# Patient Record
Sex: FEMALE | Race: WHITE | Employment: OTHER | URBAN - METROPOLITAN AREA
[De-identification: names, ages, dates, MRNs, and addresses within clinical notes are randomized per-mention and may not be internally consistent; named-entity substitution may affect disease eponyms.]

---

## 2017-02-14 ENCOUNTER — HOSPITAL ENCOUNTER (OUTPATIENT)
Dept: MAMMOGRAPHY | Age: 67
Discharge: HOME | End: 2017-02-14
Attending: INTERNAL MEDICINE | Admitting: INTERNAL MEDICINE

## 2017-02-14 DIAGNOSIS — Z12.31 ENCOUNTER FOR SCREENING MAMMOGRAM FOR BREAST CANCER: ICD-10-CM

## 2017-02-20 ENCOUNTER — HOSPITAL ENCOUNTER (OUTPATIENT)
Dept: MAMMOGRAPHY | Age: 67
Discharge: HOME | End: 2017-02-20
Admitting: INTERNAL MEDICINE

## 2017-02-20 DIAGNOSIS — R92.2 INCONCLUSIVE MAMMOGRAM: ICD-10-CM

## 2018-12-19 ENCOUNTER — OFFICE VISIT (OUTPATIENT)
Dept: ENT CLINIC | Age: 68
End: 2018-12-19
Payer: COMMERCIAL

## 2018-12-19 VITALS
DIASTOLIC BLOOD PRESSURE: 77 MMHG | HEART RATE: 74 BPM | HEIGHT: 68 IN | WEIGHT: 186 LBS | SYSTOLIC BLOOD PRESSURE: 135 MMHG | BODY MASS INDEX: 28.19 KG/M2

## 2018-12-19 DIAGNOSIS — H91.21 SUDDEN RIGHT HEARING LOSS: Primary | ICD-10-CM

## 2018-12-19 PROCEDURE — 99203 OFFICE O/P NEW LOW 30 MIN: CPT | Performed by: OTOLARYNGOLOGY

## 2018-12-19 NOTE — PROGRESS NOTES
and hyoid bone were normal.  The trachea was midline. THYROID:  Normal with no masses or tenderness or goiter bilaterally. LYMPH NODES, CERVICAL, FACIAL AND SUPRACLAVICULAR:  No lymphadenopathy detected bilaterally. IMPRESSION / Swenson Born / Darnell Black:       Wesley Baldwin was seen today for hearing loss. Diagnoses and all orders for this visit:    Sudden right hearing loss  -     MONROE Schmidt, Audiology, [facility]        RECOMMENDATIONS/PLAN:    1. I ordered audiometric testing.   2. Return for recheck and follow-up after hearing test.

## 2019-01-03 ENCOUNTER — PROCEDURE VISIT (OUTPATIENT)
Dept: AUDIOLOGY | Age: 69
End: 2019-01-03
Payer: COMMERCIAL

## 2019-01-03 DIAGNOSIS — H91.13 PRESBYCUSIS OF BOTH EARS: Primary | ICD-10-CM

## 2019-01-03 PROCEDURE — 92557 COMPREHENSIVE HEARING TEST: CPT | Performed by: AUDIOLOGIST

## 2019-01-03 PROCEDURE — 92550 TYMPANOMETRY & REFLEX THRESH: CPT | Performed by: AUDIOLOGIST

## 2019-01-07 ENCOUNTER — OFFICE VISIT (OUTPATIENT)
Dept: ENT CLINIC | Age: 69
End: 2019-01-07
Payer: COMMERCIAL

## 2019-01-07 VITALS
DIASTOLIC BLOOD PRESSURE: 79 MMHG | SYSTOLIC BLOOD PRESSURE: 146 MMHG | BODY MASS INDEX: 28.04 KG/M2 | WEIGHT: 185 LBS | HEART RATE: 84 BPM | HEIGHT: 68 IN

## 2019-01-07 DIAGNOSIS — H90.3 BILATERAL SENSORINEURAL HEARING LOSS: Primary | ICD-10-CM

## 2019-01-07 PROBLEM — H91.21 SUDDEN RIGHT HEARING LOSS: Status: ACTIVE | Noted: 2019-01-07

## 2019-01-07 PROCEDURE — 99214 OFFICE O/P EST MOD 30 MIN: CPT | Performed by: OTOLARYNGOLOGY

## 2019-07-31 ENCOUNTER — HOSPITAL ENCOUNTER (OUTPATIENT)
Dept: WOMENS IMAGING | Age: 69
Discharge: HOME | End: 2019-07-31
Payer: COMMERCIAL

## 2019-07-31 DIAGNOSIS — Z12.39 BREAST CANCER SCREENING: ICD-10-CM

## 2019-07-31 PROCEDURE — 77063 BREAST TOMOSYNTHESIS BI: CPT

## 2020-01-08 ENCOUNTER — HOSPITAL ENCOUNTER (EMERGENCY)
Age: 70
Discharge: HOME | End: 2020-01-08
Payer: COMMERCIAL

## 2020-01-08 VITALS
WEIGHT: 185 LBS | HEIGHT: 68 IN | RESPIRATION RATE: 23 BRPM | TEMPERATURE: 96.7 F | OXYGEN SATURATION: 95 % | SYSTOLIC BLOOD PRESSURE: 163 MMHG | DIASTOLIC BLOOD PRESSURE: 70 MMHG | BODY MASS INDEX: 28.04 KG/M2 | HEART RATE: 70 BPM

## 2020-01-08 PROCEDURE — 99282 EMERGENCY DEPT VISIT SF MDM: CPT

## 2020-01-08 ASSESSMENT — ENCOUNTER SYMPTOMS
VOMITING: 1
RHINORRHEA: 0
WHEEZING: 0
SHORTNESS OF BREATH: 0
DIARRHEA: 0
COUGH: 0
NAUSEA: 0
ABDOMINAL PAIN: 0

## 2020-01-08 NOTE — ED PROVIDER NOTES
[address] [facility]        Pt Name: Lisa Franks  MRN: [id number]  [place] 1950  Date of evaluation: 1/8/2020  Provider: Kelsey Gaming PA-C  PCP: Maria Teresa Mirza MD    This patient was not seen and evaluated by the attending physician No att. providers found. CHIEF COMPLAINT       Chief Complaint   Patient presents with    Allergic Reaction     pt brought from home by [facility]. pt took lexapro and then felt dizzy and nauseous. vomited. had no eaten prior. squad given 25mg of benadryl. HISTORY OF PRESENT ILLNESS   (Location/Symptom, Timing/Onset, Context/Setting, Quality, Duration, Modifying Factors, Severity)  Note limiting factors. Lisa Franks is a 71 y.o. female presents for evaluation of possible allergic reaction to Lexapro. She states that she was started on this for history of anxiety and panic attacks and took first dose today. States that about 30 minutes after taking that she became very dizzy and had a single episode of emesis. Unsure whether or not she had true syncope but states that she did lay down on the ground in the garage and felt very dizzy. Her  called EMS. She states that she remembers all of this. She was given 25 mg of Benadryl IV via squad in route. She states that she feels better at this time. She has no chest pain or shortness of breath. She does report history of COPD but denies tightness or wheezing. No cough or congestion. No abdominal pain. No rashes. No other complaints or concerns at this time. Nursing Notes were all reviewed and agreed with or any disagreements were addressed in the HPI. REVIEW OF SYSTEMS    (2-9 systems for level 4, 10 or more for level 5)     Review of Systems   Constitutional: Negative for appetite change, chills and fever. HENT: Negative for congestion and rhinorrhea. Eyes: Negative for visual disturbance. Respiratory: Negative for cough, shortness of breath and wheezing. Cardiovascular: Negative for chest pain. Gastrointestinal: Positive for vomiting. Negative for abdominal pain, diarrhea and nausea. Genitourinary: Negative for difficulty urinating, dysuria and hematuria. Musculoskeletal: Negative for neck pain and neck stiffness. Skin: Negative for rash. Neurological: Positive for dizziness. Negative for syncope, weakness, light-headedness and headaches. Positives and Pertinent negatives as per HPI. Except as noted above in the ROS, all other systems were reviewed and negative. PAST MEDICAL HISTORY     Past Medical History:   Diagnosis Date    COPD (chronic obstructive pulmonary disease) ([facility] [address]     Hyperlipidemia     Hypertension     Vitamin D deficiency          SURGICAL HISTORY   History reviewed. No pertinent surgical history. [address]       Discharge Medication List as of 2020  7:21 PM      CONTINUE these medications which have NOT CHANGED    Details   atenolol (TENORMIN) 100 MG tablet Take 1 tablet by mouth daily, Disp-90 tablet, R-0Print      pravastatin (PRAVACHOL) 20 MG tablet Take 1 tablet by mouth daily, Disp-90 tablet, R-0Print      albuterol (PROVENTIL HFA) 108 (90 BASE) MCG/ACT inhaler Inhale 2 puffs into the lungs every 6 hours as needed for Wheezing., Disp-1 Inhaler, R-3NO PRINT               ALLERGIES     Pcn [penicillins]    FAMILYHISTORY     History reviewed. No pertinent family history.        SOCIAL HISTORY       Social History     Tobacco Use    Smoking status: Former Smoker     Packs/day: 1.00     Years: 34.00     Pack years: 34.00     Types: Cigarettes     Start date:      Last attempt to quit: 1998     Years since quittin.0    Smokeless tobacco: Never Used   Substance Use Topics    Alcohol use: No    Drug use: No       SCREENINGS             PHYSICAL EXAM    (up to 7 for level 4, 8 or more for level 5)     ED Triage Vitals [01/08/20 1748]   BP Temp Temp Source Pulse Resp SpO2 Height Weight   (!) 148/62 96.7 °F (35.9 °C) Oral 67 13 91 % 5' 8\" (1.727 m) 185 lb (83.9 kg)       Physical Exam  Vitals signs and nursing note reviewed. Constitutional:       Appearance: She is well-developed. She is not diaphoretic. HENT:      Head: Normocephalic and atraumatic. Right Ear: External ear normal.      Left Ear: External ear normal.      Nose: Nose normal.   Eyes:      General:         Right eye: No discharge. Left eye: No discharge. Neck:      Musculoskeletal: Normal range of motion and neck supple. Cardiovascular:      Rate and Rhythm: Normal rate and regular rhythm. Heart sounds: Normal heart sounds. Pulmonary:      Effort: Pulmonary effort is normal. No respiratory distress. Breath sounds: Normal breath sounds. No wheezing, rhonchi or rales. Chest:      Chest wall: No tenderness. Abdominal:      General: There is no distension. Tenderness: There is no tenderness. Musculoskeletal: Normal range of motion. Skin:     General: Skin is warm and dry. Neurological:      Mental Status: She is alert and oriented to person, place, and time. Mental status is at baseline. GCS: GCS eye subscore is 4. GCS verbal subscore is 5. GCS motor subscore is 6. Cranial Nerves: Cranial nerves are intact. Psychiatric:         Behavior: Behavior normal.         DIAGNOSTIC RESULTS   LABS:    Labs Reviewed - No data to display    All other labs were within normal range or not returned as of this dictation. EKG: All EKG's are interpreted by the Emergency Department Physician in the absence of a cardiologist.  Please see their note for interpretation of EKG.       RADIOLOGY:   Non-plain film images such as CT, Ultrasound and MRI are read by the radiologist. Plain radiographic images are visualized and preliminarily interpreted by the  ED Provider with the below findings:        Interpretation per the Radiologist

## 2020-01-08 NOTE — ED NOTES
Bed: 08  Expected date:   Expected time:   Means of arrival:   Comments:  lincoln Frey, SHERRY  01/08/20 4798

## 2021-01-05 ENCOUNTER — APPOINTMENT (OUTPATIENT)
Dept: MRI IMAGING | Age: 71
End: 2021-01-05
Payer: COMMERCIAL

## 2021-01-05 ENCOUNTER — HOSPITAL ENCOUNTER (OUTPATIENT)
Dept: NON INVASIVE DIAGNOSTICS | Age: 71
Discharge: HOME | End: 2021-01-05
Payer: COMMERCIAL

## 2021-01-05 LAB
LV EF: 55 %
LVEF MODALITY: NORMAL

## 2021-01-05 PROCEDURE — 93306 TTE W/DOPPLER COMPLETE: CPT

## 2021-07-14 ENCOUNTER — APPOINTMENT (OUTPATIENT)
Dept: CT IMAGING | Age: 71
DRG: 392 | End: 2021-07-14
Payer: COMMERCIAL

## 2021-07-14 ENCOUNTER — APPOINTMENT (OUTPATIENT)
Dept: GENERAL RADIOLOGY | Age: 71
DRG: 392 | End: 2021-07-14
Payer: COMMERCIAL

## 2021-07-14 ENCOUNTER — HOSPITAL ENCOUNTER (INPATIENT)
Age: 71
LOS: 1 days | Discharge: HOME | DRG: 392 | End: 2021-07-16
Attending: INTERNAL MEDICINE | Admitting: INTERNAL MEDICINE
Payer: COMMERCIAL

## 2021-07-14 DIAGNOSIS — R09.02 HYPOXIA: ICD-10-CM

## 2021-07-14 DIAGNOSIS — R55 NEAR SYNCOPE: Primary | ICD-10-CM

## 2021-07-14 DIAGNOSIS — R19.7 NAUSEA VOMITING AND DIARRHEA: ICD-10-CM

## 2021-07-14 DIAGNOSIS — R11.2 NAUSEA VOMITING AND DIARRHEA: ICD-10-CM

## 2021-07-14 LAB
A/G RATIO: 1.3 (ref 1.1–2.2)
ALBUMIN SERPL-MCNC: 3.5 G/DL (ref 3.4–5)
ALP BLD-CCNC: 55 U/L (ref 40–129)
ALT SERPL-CCNC: 9 U/L (ref 10–40)
ANION GAP SERPL CALCULATED.3IONS-SCNC: 10 MMOL/L (ref 3–16)
AST SERPL-CCNC: 22 U/L (ref 15–37)
BASOPHILS ABSOLUTE: 0 K/UL (ref 0–0.2)
BASOPHILS RELATIVE PERCENT: 0.6 %
BILIRUB SERPL-MCNC: 0.3 MG/DL (ref 0–1)
BUN BLDV-MCNC: 16 MG/DL (ref 7–20)
CALCIUM SERPL-MCNC: 8 MG/DL (ref 8.3–10.6)
CHLORIDE BLD-SCNC: 107 MMOL/L (ref 99–110)
CO2: 22 MMOL/L (ref 21–32)
CREAT SERPL-MCNC: 0.6 MG/DL (ref 0.6–1.2)
EOSINOPHILS ABSOLUTE: 0.1 K/UL (ref 0–0.6)
EOSINOPHILS RELATIVE PERCENT: 0.9 %
GFR AFRICAN AMERICAN: >60
GFR NON-AFRICAN AMERICAN: >60
GLOBULIN: 2.7 G/DL
GLUCOSE BLD-MCNC: 137 MG/DL (ref 70–99)
HCT VFR BLD CALC: 35 % (ref 36–48)
HEMOGLOBIN: 11.5 G/DL (ref 12–16)
LACTIC ACID, SEPSIS: 1.1 MMOL/L (ref 0.4–1.9)
LIPASE: 28 U/L (ref 13–60)
LYMPHOCYTES ABSOLUTE: 0.6 K/UL (ref 1–5.1)
LYMPHOCYTES RELATIVE PERCENT: 9.2 %
MCH RBC QN AUTO: 32 PG (ref 26–34)
MCHC RBC AUTO-ENTMCNC: 33 G/DL (ref 31–36)
MCV RBC AUTO: 97.2 FL (ref 80–100)
MONOCYTES ABSOLUTE: 0.4 K/UL (ref 0–1.3)
MONOCYTES RELATIVE PERCENT: 6.2 %
NEUTROPHILS ABSOLUTE: 5.1 K/UL (ref 1.7–7.7)
NEUTROPHILS RELATIVE PERCENT: 83.1 %
PDW BLD-RTO: 13.3 % (ref 12.4–15.4)
PLATELET # BLD: 162 K/UL (ref 135–450)
PMV BLD AUTO: 9.2 FL (ref 5–10.5)
POTASSIUM REFLEX MAGNESIUM: 4.3 MMOL/L (ref 3.5–5.1)
PRO-BNP: 474 PG/ML (ref 0–124)
RBC # BLD: 3.6 M/UL (ref 4–5.2)
SODIUM BLD-SCNC: 139 MMOL/L (ref 136–145)
TOTAL PROTEIN: 6.2 G/DL (ref 6.4–8.2)
TROPONIN: <0.01 NG/ML
WBC # BLD: 6.2 K/UL (ref 4–11)

## 2021-07-14 PROCEDURE — 83605 ASSAY OF LACTIC ACID: CPT

## 2021-07-14 PROCEDURE — 2580000003 HC RX 258: Performed by: PHYSICIAN ASSISTANT

## 2021-07-14 PROCEDURE — 6360000004 HC RX CONTRAST MEDICATION: Performed by: PHYSICIAN ASSISTANT

## 2021-07-14 PROCEDURE — 96361 HYDRATE IV INFUSION ADD-ON: CPT

## 2021-07-14 PROCEDURE — U0003 INFECTIOUS AGENT DETECTION BY NUCLEIC ACID (DNA OR RNA); SEVERE ACUTE RESPIRATORY SYNDROME CORONAVIRUS 2 (SARS-COV-2) (CORONAVIRUS DISEASE [COVID-19]), AMPLIFIED PROBE TECHNIQUE, MAKING USE OF HIGH THROUGHPUT TECHNOLOGIES AS DESCRIBED BY CMS-2020-01-R: HCPCS

## 2021-07-14 PROCEDURE — 96374 THER/PROPH/DIAG INJ IV PUSH: CPT

## 2021-07-14 PROCEDURE — 84484 ASSAY OF TROPONIN QUANT: CPT

## 2021-07-14 PROCEDURE — 6370000000 HC RX 637 (ALT 250 FOR IP): Performed by: PHYSICIAN ASSISTANT

## 2021-07-14 PROCEDURE — 99284 EMERGENCY DEPT VISIT MOD MDM: CPT

## 2021-07-14 PROCEDURE — 85025 COMPLETE CBC W/AUTO DIFF WBC: CPT

## 2021-07-14 PROCEDURE — 83690 ASSAY OF LIPASE: CPT

## 2021-07-14 PROCEDURE — 93005 ELECTROCARDIOGRAM TRACING: CPT | Performed by: PHYSICIAN ASSISTANT

## 2021-07-14 PROCEDURE — 74177 CT ABD & PELVIS W/CONTRAST: CPT

## 2021-07-14 PROCEDURE — 6360000002 HC RX W HCPCS: Performed by: PHYSICIAN ASSISTANT

## 2021-07-14 PROCEDURE — 80053 COMPREHEN METABOLIC PANEL: CPT

## 2021-07-14 PROCEDURE — U0005 INFEC AGEN DETEC AMPLI PROBE: HCPCS

## 2021-07-14 PROCEDURE — 83880 ASSAY OF NATRIURETIC PEPTIDE: CPT

## 2021-07-14 PROCEDURE — 71045 X-RAY EXAM CHEST 1 VIEW: CPT

## 2021-07-14 RX ORDER — ONDANSETRON 2 MG/ML
4 INJECTION INTRAMUSCULAR; INTRAVENOUS ONCE
Status: COMPLETED | OUTPATIENT
Start: 2021-07-14 | End: 2021-07-14

## 2021-07-14 RX ORDER — DICYCLOMINE HYDROCHLORIDE 10 MG/1
10 CAPSULE ORAL ONCE
Status: COMPLETED | OUTPATIENT
Start: 2021-07-14 | End: 2021-07-14

## 2021-07-14 RX ORDER — 0.9 % SODIUM CHLORIDE 0.9 %
1000 INTRAVENOUS SOLUTION INTRAVENOUS ONCE
Status: COMPLETED | OUTPATIENT
Start: 2021-07-14 | End: 2021-07-14

## 2021-07-14 RX ADMIN — IOPAMIDOL 75 ML: 755 INJECTION, SOLUTION INTRAVENOUS at 22:06

## 2021-07-14 RX ADMIN — DICYCLOMINE HYDROCHLORIDE 10 MG: 10 CAPSULE ORAL at 21:10

## 2021-07-14 RX ADMIN — SODIUM CHLORIDE 1000 ML: 9 INJECTION, SOLUTION INTRAVENOUS at 21:09

## 2021-07-14 RX ADMIN — ONDANSETRON 4 MG: 2 INJECTION INTRAMUSCULAR; INTRAVENOUS at 21:10

## 2021-07-14 ASSESSMENT — ENCOUNTER SYMPTOMS
DIARRHEA: 1
VOMITING: 1
SHORTNESS OF BREATH: 0
ABDOMINAL PAIN: 1
NAUSEA: 1

## 2021-07-15 PROBLEM — I95.1 ORTHOSTATIC HYPOTENSION: Status: ACTIVE | Noted: 2021-07-15

## 2021-07-15 PROBLEM — K52.9 ACUTE GASTROENTERITIS: Status: ACTIVE | Noted: 2021-07-15

## 2021-07-15 PROBLEM — R09.02 HYPOXEMIA: Status: ACTIVE | Noted: 2021-07-15

## 2021-07-15 LAB
BACTERIA: ABNORMAL /HPF
BILIRUBIN URINE: NEGATIVE
BLOOD, URINE: NEGATIVE
CLARITY: CLEAR
COLOR: YELLOW
COMMENT UA: ABNORMAL
EKG ATRIAL RATE: 68 BPM
EKG DIAGNOSIS: NORMAL
EKG P AXIS: 53 DEGREES
EKG P-R INTERVAL: 182 MS
EKG Q-T INTERVAL: 402 MS
EKG QRS DURATION: 76 MS
EKG QTC CALCULATION (BAZETT): 427 MS
EKG R AXIS: 28 DEGREES
EKG T AXIS: 47 DEGREES
EKG VENTRICULAR RATE: 68 BPM
EPITHELIAL CELLS, UA: ABNORMAL /HPF (ref 0–5)
GLUCOSE URINE: NEGATIVE MG/DL
KETONES, URINE: NEGATIVE MG/DL
LEUKOCYTE ESTERASE, URINE: ABNORMAL
MICROSCOPIC EXAMINATION: YES
MUCUS: ABNORMAL /LPF
NITRITE, URINE: NEGATIVE
PH UA: 5 (ref 5–8)
PROTEIN UA: NEGATIVE MG/DL
RBC UA: ABNORMAL /HPF (ref 0–4)
SARS-COV-2, PCR: NOT DETECTED
SPECIFIC GRAVITY UA: >1.03 (ref 1–1.03)
URINE REFLEX TO CULTURE: ABNORMAL
URINE TYPE: ABNORMAL
UROBILINOGEN, URINE: 0.2 E.U./DL
WBC UA: ABNORMAL /HPF (ref 0–5)

## 2021-07-15 PROCEDURE — G0378 HOSPITAL OBSERVATION PER HR: HCPCS

## 2021-07-15 PROCEDURE — 6370000000 HC RX 637 (ALT 250 FOR IP): Performed by: INTERNAL MEDICINE

## 2021-07-15 PROCEDURE — 96372 THER/PROPH/DIAG INJ SC/IM: CPT

## 2021-07-15 PROCEDURE — 6360000002 HC RX W HCPCS: Performed by: INTERNAL MEDICINE

## 2021-07-15 PROCEDURE — 94640 AIRWAY INHALATION TREATMENT: CPT

## 2021-07-15 PROCEDURE — 1200000000 HC SEMI PRIVATE

## 2021-07-15 PROCEDURE — 94761 N-INVAS EAR/PLS OXIMETRY MLT: CPT

## 2021-07-15 PROCEDURE — 93010 ELECTROCARDIOGRAM REPORT: CPT | Performed by: INTERNAL MEDICINE

## 2021-07-15 PROCEDURE — 2700000000 HC OXYGEN THERAPY PER DAY

## 2021-07-15 PROCEDURE — 2580000003 HC RX 258: Performed by: INTERNAL MEDICINE

## 2021-07-15 PROCEDURE — 81001 URINALYSIS AUTO W/SCOPE: CPT

## 2021-07-15 RX ORDER — ONDANSETRON 2 MG/ML
4 INJECTION INTRAMUSCULAR; INTRAVENOUS EVERY 6 HOURS PRN
Status: DISCONTINUED | OUTPATIENT
Start: 2021-07-15 | End: 2021-07-16 | Stop reason: HOSPADM

## 2021-07-15 RX ORDER — ONDANSETRON 4 MG/1
4 TABLET, ORALLY DISINTEGRATING ORAL EVERY 8 HOURS PRN
Status: DISCONTINUED | OUTPATIENT
Start: 2021-07-15 | End: 2021-07-16 | Stop reason: HOSPADM

## 2021-07-15 RX ORDER — SODIUM CHLORIDE 9 MG/ML
INJECTION, SOLUTION INTRAVENOUS CONTINUOUS
Status: DISCONTINUED | OUTPATIENT
Start: 2021-07-15 | End: 2021-07-16 | Stop reason: HOSPADM

## 2021-07-15 RX ORDER — ASPIRIN 81 MG/1
81 TABLET, CHEWABLE ORAL DAILY
COMMUNITY

## 2021-07-15 RX ORDER — PRAVASTATIN SODIUM 40 MG
80 TABLET ORAL NIGHTLY
Status: DISCONTINUED | OUTPATIENT
Start: 2021-07-15 | End: 2021-07-16 | Stop reason: HOSPADM

## 2021-07-15 RX ORDER — ACETAMINOPHEN 325 MG/1
650 TABLET ORAL EVERY 6 HOURS PRN
Status: DISCONTINUED | OUTPATIENT
Start: 2021-07-15 | End: 2021-07-16 | Stop reason: HOSPADM

## 2021-07-15 RX ORDER — ATENOLOL 50 MG/1
100 TABLET ORAL DAILY
Status: DISCONTINUED | OUTPATIENT
Start: 2021-07-15 | End: 2021-07-16 | Stop reason: HOSPADM

## 2021-07-15 RX ORDER — SODIUM CHLORIDE 0.9 % (FLUSH) 0.9 %
5-40 SYRINGE (ML) INJECTION PRN
Status: DISCONTINUED | OUTPATIENT
Start: 2021-07-15 | End: 2021-07-16 | Stop reason: HOSPADM

## 2021-07-15 RX ORDER — SODIUM CHLORIDE 0.9 % (FLUSH) 0.9 %
5-40 SYRINGE (ML) INJECTION EVERY 12 HOURS SCHEDULED
Status: DISCONTINUED | OUTPATIENT
Start: 2021-07-15 | End: 2021-07-16 | Stop reason: HOSPADM

## 2021-07-15 RX ORDER — POLYETHYLENE GLYCOL 3350 17 G/17G
17 POWDER, FOR SOLUTION ORAL DAILY PRN
Status: DISCONTINUED | OUTPATIENT
Start: 2021-07-15 | End: 2021-07-16 | Stop reason: HOSPADM

## 2021-07-15 RX ORDER — BUDESONIDE AND FORMOTEROL FUMARATE DIHYDRATE 160; 4.5 UG/1; UG/1
2 AEROSOL RESPIRATORY (INHALATION) 2 TIMES DAILY
Status: DISCONTINUED | OUTPATIENT
Start: 2021-07-15 | End: 2021-07-16 | Stop reason: HOSPADM

## 2021-07-15 RX ORDER — SODIUM CHLORIDE 9 MG/ML
25 INJECTION, SOLUTION INTRAVENOUS PRN
Status: DISCONTINUED | OUTPATIENT
Start: 2021-07-15 | End: 2021-07-16 | Stop reason: HOSPADM

## 2021-07-15 RX ORDER — ACETAMINOPHEN 650 MG/1
650 SUPPOSITORY RECTAL EVERY 6 HOURS PRN
Status: DISCONTINUED | OUTPATIENT
Start: 2021-07-15 | End: 2021-07-16 | Stop reason: HOSPADM

## 2021-07-15 RX ADMIN — ENOXAPARIN SODIUM 40 MG: 40 INJECTION SUBCUTANEOUS at 09:39

## 2021-07-15 RX ADMIN — ATENOLOL 100 MG: 50 TABLET ORAL at 09:39

## 2021-07-15 RX ADMIN — Medication 10 ML: at 20:14

## 2021-07-15 RX ADMIN — Medication 2 PUFF: at 12:17

## 2021-07-15 RX ADMIN — SODIUM CHLORIDE: 9 INJECTION, SOLUTION INTRAVENOUS at 05:23

## 2021-07-15 RX ADMIN — Medication 2 PUFF: at 21:02

## 2021-07-15 RX ADMIN — SERTRALINE 50 MG: 50 TABLET, FILM COATED ORAL at 09:39

## 2021-07-15 RX ADMIN — Medication 10 ML: at 09:41

## 2021-07-15 RX ADMIN — PRAVASTATIN SODIUM 80 MG: 40 TABLET ORAL at 19:59

## 2021-07-15 RX ADMIN — SODIUM CHLORIDE: 9 INJECTION, SOLUTION INTRAVENOUS at 20:01

## 2021-07-15 ASSESSMENT — PAIN SCALES - GENERAL
PAINLEVEL_OUTOF10: 0

## 2021-07-15 NOTE — ED NOTES
Report called to accepting RN on [facility]. No further questions at this time. Pt transported via ED cot to inpatient room on O2. A&O x3 at transfer.       Chance Mckeon RN  07/15/21 8925

## 2021-07-15 NOTE — PROGRESS NOTES
Pt admitted from ER to [address] [place] [address] and connected to the cardiac monitoring. A&Ox4, NSR, afebrile, denies pain, denies N/V/D/C at time of assessment. Pt is oriented to the floor and room. Steady gait when ambulating to bath room. Bed locked in the lowest position, call light within reach.

## 2021-07-15 NOTE — ED NOTES
Bed: 15  Expected date:   Expected time:   Means of arrival: [facility]  Comments:     [employer]  07/14/21 2002

## 2021-07-15 NOTE — ED NOTES
Report called on pt to 5T RN accepting pt. No further questions at this time. IV continued at admit. Pt to be transported via wheelchair.       Rosa Kaur RN  07/15/21 0915

## 2021-07-15 NOTE — ED NOTES
Pt started off at 97% on 5L NC. By the time pt walked to bathroom about 30 feet away SPO2 was 86%. Pt asymptomatic. Pt came up to 89% when walking back to bed.      Conchita Chamberlain RN  07/15/21 0010

## 2021-07-15 NOTE — ED PROVIDER NOTES
[address] [facility]        Pt Name: Mary Foote  MRN: [id number]  [place] 1950  Date of evaluation: 7/14/2021  Provider: Kenna Kasper PA-C  PCP: Gustabo Menjivar MD  Note Started: 8:29 PM EDT       ISMA. I have evaluated this patient. My supervising physician was available for consultation. CHIEF COMPLAINT       Chief Complaint   Patient presents with    Loss of Consciousness     pt passed out earlier today. Pt also feeling weak.  Nausea     Pt having n/v episode. HISTORY OF PRESENT ILLNESS   (Location, Timing/Onset, Context/Setting, Quality, Duration, Modifying Factors, Severity, Associated Signs and Symptoms)  Note limiting factors. Chief Complaint: N/V/D     Mary Foote is a 79 y.o. female who presents patient presents emergency department for evaluation of nausea vomiting and diarrhea that started today. Patient states that around 11 AM it hit her all of a sudden with vomiting and diarrhea. Patient states she has had multiple bowel movements throughout the day and started to feel generally weak. Patient states every time she would stand up she would feel as though she was going to pass out. Patient clarifies to this provider that she did not actually pass out. She never had a full syncopal episode. Patient states she is unable to tolerate anything by mouth at this time. Patient states she has a history of COPD and does use inhalers daily. She states she does not use oxygen at home. She states she does not feel short of breath. She denies any worsening cough. She denies any fever. Patient states she felt completely well yesterday. Patient states she is fully vaccinated against Covid. Family members are all feeling well. Nursing Notes were all reviewed and agreed with or any disagreements were addressed in the HPI.     REVIEW OF SYSTEMS    (2-9 systems for level 4, 10 or more for level 5)     Review of Systems Constitutional: Negative for fatigue and fever. HENT: Negative. Eyes: Negative for visual disturbance. Respiratory: Negative for shortness of breath. Cardiovascular: Negative for chest pain. Gastrointestinal: Positive for abdominal pain, diarrhea, nausea and vomiting. Genitourinary: Negative. Musculoskeletal: Negative. Skin: Negative. Neurological: Negative. Positives and Pertinent negatives as per HPI. Except as noted above in the ROS, all other systems were reviewed and negative. PAST MEDICAL HISTORY     Past Medical History:   Diagnosis Date    COPD (chronic obstructive pulmonary disease) ([facility] [address]     Hyperlipidemia     Hypertension     Vitamin D deficiency          SURGICAL HISTORY   History reviewed. No pertinent surgical history. [address]       Current Discharge Medication List      CONTINUE these medications which have NOT CHANGED    Details   aspirin 81 MG chewable tablet Take 81 mg by mouth daily      sertraline (ZOLOFT) 50 MG tablet Take 50 mg by mouth daily      atenolol (TENORMIN) 100 MG tablet Take 1 tablet by mouth daily  Qty: 90 tablet, Refills: 0    Associated Diagnoses: Hypertension      pravastatin (PRAVACHOL) 20 MG tablet Take 1 tablet by mouth daily  Qty: 90 tablet, Refills: 0    Associated Diagnoses: Hyperlipidemia      albuterol (PROVENTIL HFA) 108 (90 BASE) MCG/ACT inhaler Inhale 2 puffs into the lungs every 6 hours as needed for Wheezing. Qty: 1 Inhaler, Refills: 3    Associated Diagnoses: [facility]               ALLERGIES     Pcn [penicillins]    FAMILYHISTORY     History reviewed. No pertinent family history.        SOCIAL HISTORY       Social History     Tobacco Use    Smoking status: Former Smoker     Packs/day: 1.00     Years: 34.00     Pack years: 34.00     Types: Cigarettes     Start date:      Quit date: 1998     Years since quittin.5    Smokeless tobacco: Never Used Substance Use Topics    Alcohol use: No    Drug use: No       SCREENINGS             PHYSICAL EXAM    (up to 7 for level 4, 8 or more for level 5)     ED Triage Vitals [07/14/21 2011]   BP Temp Temp Source Pulse Resp SpO2 Height Weight   124/60 96.9 °F (36.1 °C) Oral 67 -- (!) 88 % 5' 8\" (1.727 m) 175 lb (79.4 kg)       Physical Exam  Vitals and nursing note reviewed. Constitutional:       General: She is not in acute distress. Appearance: Normal appearance. She is well-developed. She is not ill-appearing, toxic-appearing or diaphoretic. HENT:      Head: Normocephalic and atraumatic. Nose: Nose normal.      Mouth/Throat:      Mouth: Mucous membranes are moist.      Pharynx: Oropharynx is clear. Eyes:      General:         Right eye: No discharge. Left eye: No discharge. Conjunctiva/sclera: Conjunctivae normal.      Pupils: Pupils are equal, round, and reactive to light. Cardiovascular:      Rate and Rhythm: Normal rate and regular rhythm. Heart sounds: Normal heart sounds. No murmur heard. No gallop. Pulmonary:      Effort: Pulmonary effort is normal. No respiratory distress. Breath sounds: Normal breath sounds. No wheezing, rhonchi or rales. Abdominal:      General: Abdomen is flat. Bowel sounds are normal.      Palpations: Abdomen is soft. Tenderness: There is no abdominal tenderness. There is no guarding or rebound. Musculoskeletal:         General: Normal range of motion. Cervical back: Normal range of motion and neck supple. Right lower leg: No edema. Left lower leg: No edema. Skin:     General: Skin is warm and dry. Coloration: Skin is not jaundiced or pale. Findings: No rash. Neurological:      Mental Status: She is alert and oriented to person, place, and time.    Psychiatric:         Behavior: Behavior normal.         DIAGNOSTIC RESULTS   LABS:    Labs Reviewed   CBC WITH AUTO DIFFERENTIAL - Abnormal; Notable for the following components:       Result Value    RBC 3.60 (*)     Hemoglobin 11.5 (*)     Hematocrit 35.0 (*)     Lymphocytes Absolute 0.6 (*)     All other components within normal limits    Narrative:     Performed at:  [facility] [address] [employer], [employer] [place]   Phone [phone]   COMPREHENSIVE METABOLIC PANEL W/ REFLEX TO MG FOR LOW K - Abnormal; Notable for the following components:    Glucose 137 (*)     Calcium 8.0 (*)     Total Protein 6.2 (*)     ALT 9 (*)     All other components within normal limits    Narrative:     Performed at:  [facility] [address] [facility] [place] [place]   Phone [phone]   BRAIN NATRIURETIC PEPTIDE - Abnormal; Notable for the following components:    Pro- (*)     All other components within normal limits    Narrative:     Performed at:  [facility] [address] [employer], [employer]   Phone [phone]   URINE RT REFLEX TO CULTURE - Abnormal; Notable for the following components:    Leukocyte Esterase, Urine TRACE (*)     All other components within normal limits    Narrative:     Performed at:  [facility] [address] [facility] [employer]   Phone [phone]   MICROSCOPIC URINALYSIS - Abnormal; Notable for the following components:    Mucus, UA Rare (*)     Bacteria, UA Rare (*)     All other components within normal limits    Narrative:     Performed at:  [facility] [address] [employer]   Phone [phone]   GASTROINTESTINAL PANEL, MOLECULAR   LIPASE    Narrative:     Performed at:  [facility] [address], [place]   Phone [phone]   TROPONIN    Narrative:     Performed at:  [facility] [address], [place]   Phone [phone]   LACTATE, SEPSIS Narrative:     Performed at:  [facility]  [address],  [place], [address]   Phone [phone]       When ordered only abnormal lab results are displayed. All other labs were within normal range or not returned as of this dictation. EKG: When ordered, EKG's are interpreted by the Emergency Department Physician in the absence of a cardiologist.  Please see their note for interpretation of EKG. RADIOLOGY:   Non-plain film images such as CT, Ultrasound and MRI are read by the radiologist. Plain radiographic images are visualized and preliminarily interpreted by the ED Provider with the below findings:        Interpretation per the Radiologist below, if available at the time of this note:    CT ABDOMEN PELVIS W IV CONTRAST Additional Contrast? None   Final Result   Small air-fluid levels throughout the bowel which could be due to a   developing early ileus or enteritis. Recommend short-term follow-up      Mild bibasilar atelectasis or early infiltrates. Recommend follow-up with   chest x-rays. 3 cm cyst right kidney and smaller hypodensities scattered in both kidneys   which may also represent cysts but are too small to further characterize. Mild cortical scarring along the upper pole right kidney with no   hydronephrosis or renal stones. Atrophic uterus with no pelvic mass or active inflammation. Mild chronic liver changes with fatty replacement throughout. XR CHEST PORTABLE   Final Result   No acute cardiopulmonary disease. No results found. PROCEDURES   Unless otherwise noted below, none     Procedures    CRITICAL CARE TIME   There was a high probability of life-threatening clinical deterioration in the patient's condition requiring my urgent intervention. The total critical care time spent while evaluating and treating this patient was at least 30 minutes.  This excludes time spent doing separately billable Stopped 7/14/21 2219)   dicyclomine (BENTYL) capsule 10 mg (10 mg Oral Given 7/14/21 2110)   ondansetron (ZOFRAN) injection 4 mg (4 mg Intravenous Given 7/14/21 2110)   iopamidol (ISOVUE-370) 76 % injection 75 mL (75 mLs Intravenous Given 7/14/21 2206)           Patient presents emergency department for evaluation of feeling lightheaded with abdominal pain, nausea vomiting and diarrhea. Patient states that she felt as though she may pass out however did not have any full syncopal episodes. Patient is alert and oriented no acute distress. Vitals are stable and she is afebrile. Patient's abdomen is nontender on examination. Bowel sounds normal bilaterally. Mucous membranes are moist.  Heart rate is regular without murmurs rubs or gallops. Lungs are clear to auscultation bilaterally. Patient was noted to be hypoxic at 88% on room air. She was placed on 2 L nasal cannula. Patient saturations increased back to the 90s. Patient states she does not feel short of breath currently. Patient was given 1 L of fluids. Laboratory evaluation was largely unremarkable. CT shows early ileus versus enteritis. Patient was given Bentyl and Zofran and on reevaluation states improvement of her symptoms with these medications. Patient was able to ambulate to the bathroom without feeling lightheaded however she did become hypoxic down to 86%. Orthostatic vitals were positive for a drop in blood pressure with standing. Patient did not feel lightheaded with this blood pressure change. Orthostatics were obtained after the fluid administration. Given the new hypoxia and continued orthostatic hypotension patient will be admitted to the hospital for further management. Patient's PCP Dr. Natty Mcgregor accepts the patient for admission. FINAL IMPRESSION      1. Near syncope    2. Nausea vomiting and diarrhea    3.  Hypoxia          DISPOSITION/PLAN   DISPOSITION Admitted 07/15/2021 01:17:25 AM      PATIENT REFERRED TO:  No follow-up provider specified.     DISCHARGE MEDICATIONS:  Current Discharge Medication List          DISCONTINUED MEDICATIONS:  Current Discharge Medication List                 (Please note that portions of this note were completed with a voice recognition program.  Efforts were made to edit the dictations but occasionally words are mis-transcribed.)    Javy Pena PA-C (electronically signed)            Javy Pena PA-C  07/15/21 2961

## 2021-07-15 NOTE — PROGRESS NOTES
4 Eyes Skin Assessment     NAME:  Herbert Acosta  YOB: 1950  MEDICAL RECORD NUMBER:  [id number]    The patient is being assess for  Admission    I agree that 2 RN's have performed a thorough Head to Toe Skin Assessment on the patient. ALL assessment sites listed below have been assessed. Areas assessed by both nurses:    Head, Face, Ears, Shoulders, Back, Chest, Arms, Elbows, Hands, Sacrum. Buttock, Coccyx, Ischium and Legs. Feet and Heels        Does the Patient have a Wound?  No noted wound(s)       Andrez Prevention initiated:  Yes   Wound Care Orders initiated:  No    Pressure Injury (Stage 3,4, Unstageable, DTI, NWPT, and Complex wounds) if present place consult order under [de-identified] No    New and Established Ostomies if present place consult order under : No      Nurse 1 eSignature: Electronically signed by Joel Briseno RN on 7/15/21 at 6:20 AM EDT    **SHARE this note so that the co-signing nurse is able to place an eSignature**    Nurse 2 eSignature: Electronically signed by Merrill Benton RN on 7/15/21 at 6:22 AM EDT

## 2021-07-15 NOTE — ED NOTES
RN attempted to call report to a new unit due to room change. Report refused due to staffing change.       Pato Freeman RN  07/15/21 3153

## 2021-07-15 NOTE — H&P
quittin.5    Smokeless tobacco: Never Used   Substance Use Topics    Alcohol use: No        REVIEW OF SYSTEMS: -   General ROS:denies any headache but has weakness  Ophthalmic ROS: denies any blurred vision, double vision or vision loss  ENT ROS: denies any epistaxis, nasal discharge  Hematological and Lymphatic: denies any fatigue, night sweats, enlarge lymph nodes or bruising ,   Respiratory ROS: denies any shortness of breath, dyspnea on exertion, wheezing, or cough   Cardiovascular ROS: denies any chest pain, palpitations, shortness of breath, dizziness syncope or swelling in extremities  Gastrointestinal ROS: Had nausea, vomiting and diarrhea    Musculoskeletal ROS:denies any back pain or joint pain,  Neurological ROS: denies any mental status changes, seizures, memory loss, speech problems or muscle weakness in extremities   Dermatological ROS: denies any rash or skin lesions     OBJECTIVE:      PHYSICAL:   VITALS:  /64   Pulse 82   Temp 98.6 °F (37 °C) (Temporal)   Resp 17   Ht 5' 8\" (1.727 m)   Wt 183 lb (83 kg)   SpO2 97%   BMI 27.83 kg/m²   PULSE OXIMETRY RANGE: SpO2  Av.9 %  Min: 88 %  Max: 99 %    Intake/Output Summary (Last 24 hours) at 7/15/2021 0758  Last data filed at 7/15/2021 0429  Gross per 24 hour   Intake 240 ml   Output --   Net 240 ml      CONSTITUTIONAL:  awake, alert, cooperative, no apparent distress, and appears stated age  HEAD:  Normocephalic, atraumatic  HEENT:  ENT exam normal, no neck nodes or sinus tenderness  EYE: conjunctivae/corneas clear. PERRL, EOM's intact. Fundi benign.   NECK:  Supple, symmetrical, trachea midline, no adenopathy, thyroid symmetric, not enlarged and no tenderness, skin normal  LUNGS:  no increased work of breathing, decreased air exchange and A few scattered wheezes  CARDIOVASCULAR:   Regular rate and rhythm  ABDOMEN:  No scars, normal bowel sounds, soft, non-distended, non-tender, no masses palpated, no hepatosplenomegally  MUSCULOSKELETAL:  There is no redness, warmth, or swelling of the joints. Full range of motion noted. Motor strength is 5 out of 5 all extremities bilaterally. Tone is normal.  NEUROLOGIC:  Awake, alert, oriented to name, place and time. Cranial nerves II-XII are grossly intact. Motor is 5 out of 5 bilaterally. Cerebellar finger to nose, heel to shin intact. Sensory is intact.   Babinski down going, Romberg negative, and gait is normal.  SKIN:  no bruising or bleeding  EDEMA: Normal    Data    Labs Reviewed   CBC WITH AUTO DIFFERENTIAL - Abnormal; Notable for the following components:       Result Value    RBC 3.60 (*)     Hemoglobin 11.5 (*)     Hematocrit 35.0 (*)     Lymphocytes Absolute 0.6 (*)     All other components within normal limits    Narrative:     Performed at:  [facility] [address], [place], [address]   Phone [phone]   COMPREHENSIVE METABOLIC PANEL W/ REFLEX TO MG FOR LOW K - Abnormal; Notable for the following components:    Glucose 137 (*)     Calcium 8.0 (*)     Total Protein 6.2 (*)     ALT 9 (*)     All other components within normal limits    Narrative:     Performed at:  [facility] [address], [place], [address]   Phone [phone]   BRAIN NATRIURETIC PEPTIDE - Abnormal; Notable for the following components:    Pro- (*)     All other components within normal limits    Narrative:     Performed at:  [facility] [address], [place], [address]   Phone [phone]   URINE RT REFLEX TO CULTURE - Abnormal; Notable for the following components:    Leukocyte Esterase, Urine TRACE (*)     All other components within normal limits    Narrative:     Performed at:  [facility] [address], [place], [address]   Phone [phone]   MICROSCOPIC URINALYSIS - Abnormal; Notable for the following components:    Mucus, UA Rare (*)     Bacteria, UA Rare (*)     All other components within normal limits    Narrative:     Performed at:  [facility]  [address] [facility]  [place], [address]   Phone [phone]   GASTROINTESTINAL PANEL, MOLECULAR   LIPASE    Narrative:     Performed at:  [facility]  [address] [facility]  [place], [address]   Phone [phone]   TROPONIN    Narrative:     Performed at:  [facility]  [address] [facility]  [place], [address]   Phone [phone]   LACTATE, SEPSIS    Narrative:     Performed at:  [facility]  [address] [facility],  [place], [address]   Phone [phone]     CBC:   Lab Results   Component Value Date    WBC 6.2 07/14/2021    RBC 3.60 07/14/2021    HGB 11.5 07/14/2021    HCT 35.0 07/14/2021    MCV 97.2 07/14/2021    MCH 32.0 07/14/2021    MCHC 33.0 07/14/2021    RDW 13.3 07/14/2021     07/14/2021    MPV 9.2 07/14/2021     CMP:    Lab Results   Component Value Date     07/14/2021    K 4.3 07/14/2021     07/14/2021    CO2 22 07/14/2021    BUN 16 07/14/2021    CREATININE 0.6 07/14/2021    GFRAA >60 07/14/2021    GFRAA >60 05/14/2013    AGRATIO 1.3 07/14/2021    LABGLOM >60 07/14/2021    GLUCOSE 137 07/14/2021    PROT 6.2 07/14/2021    PROT 7.3 02/08/2013    LABALBU 3.5 07/14/2021    CALCIUM 8.0 07/14/2021    BILITOT 0.3 07/14/2021    ALKPHOS 55 07/14/2021    AST 22 07/14/2021    ALT 9 07/14/2021       Imaging  ABDOMEN PELVIS W IV CONTRAST Additional Contrast? None [[id number]] Collected: 07/14/21 2255      Order Status: Completed Updated: 07/14/21 2308     Narrative:       EXAMINATION:   CT OF THE ABDOMEN AND PELVIS WITH CONTRAST 7/14/2021 10:05 pm     TECHNIQUE:   CT of the abdomen and pelvis was performed with the administration of   intravenous contrast. Multiplanar reformatted images are provided for review.    Dose modulation, iterative reconstruction, and/or weight based adjustment of   the mA/kV was utilized to reduce the radiation dose to as low as reasonably   achievable. COMPARISON:   None. HISTORY:   ORDERING SYSTEM PROVIDED HISTORY: generaized abd pain, n/v/d/ since 11 am   TECHNOLOGIST PROVIDED HISTORY:   Additional Contrast?->None   Reason for exam:->generaized abd pain, n/v/d/ since 11 am   Decision Support Exception - unselect if not a suspected or confirmed   emergency medical condition->Emergency Medical Condition (MA)   Reason for Exam: generaized abd pain, n/v/d/ since 11 am   Acuity: Acute   Type of Exam: Initial   Relevant Medical/Surgical History: Loss of Consciousness (pt passed out   earlier today. Pt also feeling weak.)     FINDINGS:   Lower Chest:   There are subpleural parenchymal opacities scattered along the   lung bases posterolaterally. Organs: The liver and spleen are normal size and density.  No focal lesion is   seen.  The gallbladder, pancreas, and bile ducts are normal.  The adrenals   are normal.  There is cortical scarring along the upper pole right kidney and   there are small hypodensities scattered in both kidneys with the largest on   the right posteriorly measuring 3 cm and measures water density.  No   hydronephrosis or renal stones are seen.  The ureters are normal caliber     GI/Bowel:   There are small air-fluid levels throughout the bowel which is   minimally dilated throughout mildly dilated is most prominent in the small   bowel in the pelvis.  The appendix is not well visualized with no pericecal   inflammation.  The mesentery is unremarkable. Pelvis:   The bladder is unremarkable.  The uterus has been removed. Darian Fail   is no adnexal mass.  The uterus is atrophic with no adnexal mass or free   fluid.  The mesentery is unremarkable.      Peritoneum/Retroperitoneum:   There is moderate calcified plaque throughout   the aorta which is normal caliber with no aneurysm or dissection and no   retroperitoneal mass or adenopathy     Bones/Soft Tissues:   There are moderate degenerative changes throughout the   spine with mild scoliosis.      Impression:       Small air-fluid levels throughout the bowel which could be due to a   developing early ileus or enteritis.  Recommend short-term follow-up     Mild bibasilar atelectasis or early infiltrates.  Recommend follow-up with   chest x-rays. 3 cm cyst right kidney and smaller hypodensities scattered in both kidneys   which may also represent cysts but are too small to further characterize. Mild cortical scarring along the upper pole right kidney with no   hydronephrosis or renal stones. Atrophic uterus with no pelvic mass or active inflammation. Mild chronic liver changes with fatty replacement throughout.      XR CHEST PORTABLE [[id number]] Collected: 07/14/21 2119     Order Status: Completed Updated: 07/14/21 2123     Narrative:       EXAMINATION:   ONE XRAY VIEW OF THE CHEST     7/14/2021 8:40 pm     COMPARISON:   10/12/2016     HISTORY:   ORDERING SYSTEM PROVIDED HISTORY: near syncope, n/v/d, hx copd   TECHNOLOGIST PROVIDED HISTORY:   Reason for exam:->near syncope, n/v/d, hx copd   Reason for Exam: near syncope, n/v/d, hx copd   Acuity: Unknown   Type of Exam: Unknown     FINDINGS:   Cardiac silhouette is normal.  There is thoracic aortic calcification.  Hilar   contours are normal.  No focal airspace disease.      Impression:       No acute cardiopulmonary disease.              ASSESSMENT      Patient Active Problem List   Diagnosis    Hypertension    Hyperlipidemia    COPD (chronic obstructive pulmonary disease) (HCC)    Vitamin D deficiency    Sudden right hearing loss    Bilateral sensorineural hearing loss    Acute gastroenteritis    Hypoxemia    Orthostatic hypotension         PLAN  1. The patient was admitted to medical floor with gastroenteritis and orthostatic hypotension  2.  The patient was started on IV fluids normal saline at 125 cc per hour  3. Resume home medications  4. Lovenox for DVT prophylaxis  5. GI bacteria pathogen  6. Recheck labs in the morning.   7. Wean off oxygen    Discussed with the patient,  and nursing staff

## 2021-07-15 NOTE — PROGRESS NOTES
Shift assessment complete. See details in flowsheets. Patient on 3L NC oxygen saturation mid 90's. Patient sybil's any pain or nausea. Patient needs met at the same time. Will continue to monitor.

## 2021-07-15 NOTE — ED NOTES
RN attempted to call report on pt again. Nurse unavailable at this time.       Aye Rasheed RN  07/15/21 3170

## 2021-07-16 VITALS
SYSTOLIC BLOOD PRESSURE: 135 MMHG | HEART RATE: 80 BPM | RESPIRATION RATE: 14 BRPM | WEIGHT: 186.9 LBS | OXYGEN SATURATION: 93 % | BODY MASS INDEX: 28.33 KG/M2 | HEIGHT: 68 IN | DIASTOLIC BLOOD PRESSURE: 63 MMHG | TEMPERATURE: 96.7 F

## 2021-07-16 PROBLEM — J96.11 CHRONIC RESPIRATORY FAILURE WITH HYPOXIA (HCC): Status: ACTIVE | Noted: 2021-07-16

## 2021-07-16 LAB
ANION GAP SERPL CALCULATED.3IONS-SCNC: 7 MMOL/L (ref 3–16)
BASOPHILS ABSOLUTE: 0.1 K/UL (ref 0–0.2)
BASOPHILS RELATIVE PERCENT: 0.6 %
BUN BLDV-MCNC: 9 MG/DL (ref 7–20)
CALCIUM SERPL-MCNC: 8.1 MG/DL (ref 8.3–10.6)
CHLORIDE BLD-SCNC: 107 MMOL/L (ref 99–110)
CO2: 25 MMOL/L (ref 21–32)
CREAT SERPL-MCNC: 0.5 MG/DL (ref 0.6–1.2)
EOSINOPHILS ABSOLUTE: 0.1 K/UL (ref 0–0.6)
EOSINOPHILS RELATIVE PERCENT: 1.5 %
GFR AFRICAN AMERICAN: >60
GFR NON-AFRICAN AMERICAN: >60
GLUCOSE BLD-MCNC: 94 MG/DL (ref 70–99)
HCT VFR BLD CALC: 33.4 % (ref 36–48)
HEMOGLOBIN: 11.3 G/DL (ref 12–16)
LYMPHOCYTES ABSOLUTE: 1.6 K/UL (ref 1–5.1)
LYMPHOCYTES RELATIVE PERCENT: 16.8 %
MCH RBC QN AUTO: 32.2 PG (ref 26–34)
MCHC RBC AUTO-ENTMCNC: 33.7 G/DL (ref 31–36)
MCV RBC AUTO: 95.5 FL (ref 80–100)
MONOCYTES ABSOLUTE: 0.9 K/UL (ref 0–1.3)
MONOCYTES RELATIVE PERCENT: 9.1 %
NEUTROPHILS ABSOLUTE: 6.9 K/UL (ref 1.7–7.7)
NEUTROPHILS RELATIVE PERCENT: 72 %
PDW BLD-RTO: 13.4 % (ref 12.4–15.4)
PLATELET # BLD: 162 K/UL (ref 135–450)
PMV BLD AUTO: 9.7 FL (ref 5–10.5)
POTASSIUM REFLEX MAGNESIUM: 3.9 MMOL/L (ref 3.5–5.1)
RBC # BLD: 3.5 M/UL (ref 4–5.2)
SODIUM BLD-SCNC: 139 MMOL/L (ref 136–145)
WBC # BLD: 9.7 K/UL (ref 4–11)

## 2021-07-16 PROCEDURE — 94640 AIRWAY INHALATION TREATMENT: CPT

## 2021-07-16 PROCEDURE — 96372 THER/PROPH/DIAG INJ SC/IM: CPT

## 2021-07-16 PROCEDURE — 85025 COMPLETE CBC W/AUTO DIFF WBC: CPT

## 2021-07-16 PROCEDURE — 6370000000 HC RX 637 (ALT 250 FOR IP): Performed by: PHYSICIAN ASSISTANT

## 2021-07-16 PROCEDURE — 6360000002 HC RX W HCPCS: Performed by: INTERNAL MEDICINE

## 2021-07-16 PROCEDURE — 2700000000 HC OXYGEN THERAPY PER DAY

## 2021-07-16 PROCEDURE — 2580000003 HC RX 258: Performed by: INTERNAL MEDICINE

## 2021-07-16 PROCEDURE — G0378 HOSPITAL OBSERVATION PER HR: HCPCS

## 2021-07-16 PROCEDURE — 94761 N-INVAS EAR/PLS OXIMETRY MLT: CPT

## 2021-07-16 PROCEDURE — 80048 BASIC METABOLIC PNL TOTAL CA: CPT

## 2021-07-16 PROCEDURE — 6370000000 HC RX 637 (ALT 250 FOR IP): Performed by: INTERNAL MEDICINE

## 2021-07-16 PROCEDURE — 36415 COLL VENOUS BLD VENIPUNCTURE: CPT

## 2021-07-16 PROCEDURE — 94680 O2 UPTK RST&XERS DIR SIMPLE: CPT

## 2021-07-16 RX ORDER — LANOLIN ALCOHOL/MO/W.PET/CERES
3 CREAM (GRAM) TOPICAL NIGHTLY PRN
Status: DISCONTINUED | OUTPATIENT
Start: 2021-07-16 | End: 2021-07-16 | Stop reason: HOSPADM

## 2021-07-16 RX ORDER — BUDESONIDE AND FORMOTEROL FUMARATE DIHYDRATE 160; 4.5 UG/1; UG/1
2 AEROSOL RESPIRATORY (INHALATION) 2 TIMES DAILY
Qty: 1 INHALER | Refills: 0 | Status: SHIPPED | OUTPATIENT
Start: 2021-07-16

## 2021-07-16 RX ADMIN — SERTRALINE 50 MG: 50 TABLET, FILM COATED ORAL at 08:59

## 2021-07-16 RX ADMIN — Medication 10 ML: at 09:01

## 2021-07-16 RX ADMIN — ATENOLOL 100 MG: 50 TABLET ORAL at 08:59

## 2021-07-16 RX ADMIN — MELATONIN TAB 3 MG 3 MG: 3 TAB at 01:10

## 2021-07-16 RX ADMIN — ENOXAPARIN SODIUM 40 MG: 40 INJECTION SUBCUTANEOUS at 08:59

## 2021-07-16 RX ADMIN — Medication 2 PUFF: at 08:48

## 2021-07-16 RX ADMIN — SODIUM CHLORIDE: 9 INJECTION, SOLUTION INTRAVENOUS at 03:54

## 2021-07-16 ASSESSMENT — PAIN SCALES - GENERAL
PAINLEVEL_OUTOF10: 0
PAINLEVEL_OUTOF10: 0

## 2021-07-16 NOTE — PLAN OF CARE
Problem: Falls - Risk of:  Goal: Will remain free from falls  Description: Will remain free from falls  7/15/2021 2013 by Marvia Homans, RN  Outcome: Ongoing  7/15/2021 1826 by Deniz Moreau RN  Outcome: Met This Shift

## 2021-07-16 NOTE — PROGRESS NOTES
IV removed. Discharge luis e read over and signed by patient and RN. All questions answered. Belongings collected. Patient waiting in room with  for home oxygen.

## 2021-07-16 NOTE — DISCHARGE SUMMARY
Physician Discharge Summary     Patient ID:  Eri Murrell  [id number]  16 y.o.  1950    Admit date: 7/14/2021    Discharge date and time: 07/16/21     Admitting Physician: Rod Osborne MD     Discharge Physician: Rod Osborne MD    Admission Diagnoses: Acute gastroenteritis [K52.9] Chronic Respiratory Failure with Hypoxia, COPD, Orthostatic hypotension    Discharge Diagnoses: Chronic Respiratory Failure with Hypoxia, COPD    Full Hospital Problem List:  Active Hospital Problems    Diagnosis Date Noted    Chronic respiratory failure with hypoxia ([facility] [J96.11] 07/16/2021    Acute gastroenteritis [K52.9] 07/15/2021    Hypoxemia [R09.02] 07/15/2021    Orthostatic hypotension [I95.1] 07/15/2021    COPD (chronic obstructive pulmonary disease) ([facility] [J44.9]        Discharged Condition: good    Hospital Course: SUBJECTIVE:  The patient is a 70-year-old white female with past medical history significant for hypertension, COPD, Anxiety and hypercholesterolemia. The patient presented to the ER 7/14 with complaint of having dizziness and feeling very weak. She states her  symptoms started after 11 AM when she started having nausea, vomiting and diarrhea. She denies any fever or chills. She denies any chest pain or shortness of breath. She denies any recent travel. She attempted to walk in the ER and despite 1 L of IV fluids she was very orthostatic. The patient was hypoxemic in the ER, She is currently on 2 L of oxygen and her saturation is 96%. The patient will be admitted for further evaluation and management. She was given iv fluids. Covid test was negative  The patient felt better. However, remained hypoxic requiring 2lpm of oxygen. Pt qualified for home oxygen and is anxious to go home. She will be discharged to home in care of herself with home oxygen. She will follow up in our office next week.      Disposition: home    Consults made during Hospitalization:  IP CONSULT TO FINANCIAL COUNSELOR    Treatment team at time of Discharge: Treatment Team: Attending Provider: Lukas Barragan MD; Registered Nurse: Pam Ulloa RN; Utilization Reviewer: Norberto Benavides RN; Respiratory Therapist (Day): Andrew Benz RCP    Imaging Results:  CT ABDOMEN PELVIS W IV CONTRAST Additional Contrast? None    Result Date: 7/14/2021  EXAMINATION: CT OF THE ABDOMEN AND PELVIS WITH CONTRAST 7/14/2021 10:05 pm TECHNIQUE: CT of the abdomen and pelvis was performed with the administration of intravenous contrast. Multiplanar reformatted images are provided for review. Dose modulation, iterative reconstruction, and/or weight based adjustment of the mA/kV was utilized to reduce the radiation dose to as low as reasonably achievable. COMPARISON: None. HISTORY: ORDERING SYSTEM PROVIDED HISTORY: generaized abd pain, n/v/d/ since 11 am TECHNOLOGIST PROVIDED HISTORY: Additional Contrast?->None Reason for exam:->generaized abd pain, n/v/d/ since 11 am Decision Support Exception - unselect if not a suspected or confirmed emergency medical condition->Emergency Medical Condition (MA) Reason for Exam: generaized abd pain, n/v/d/ since 11 am Acuity: Acute Type of Exam: Initial Relevant Medical/Surgical History: Loss of Consciousness (pt passed out earlier today. Pt also feeling weak.) FINDINGS: Lower Chest:   There are subpleural parenchymal opacities scattered along the lung bases posterolaterally. Organs: The liver and spleen are normal size and density. No focal lesion is seen. The gallbladder, pancreas, and bile ducts are normal.  The adrenals are normal.  There is cortical scarring along the upper pole right kidney and there are small hypodensities scattered in both kidneys with the largest on the right posteriorly measuring 3 cm and measures water density. No hydronephrosis or renal stones are seen. The ureters are normal caliber GI/Bowel:    There are small air-fluid levels throughout the bowel which is minimally dilated throughout mildly dilated is most prominent in the small bowel in the pelvis. The appendix is not well visualized with no pericecal inflammation. The mesentery is unremarkable. Pelvis: The bladder is unremarkable. The uterus has been removed. There is no adnexal mass. The uterus is atrophic with no adnexal mass or free fluid. The mesentery is unremarkable. Peritoneum/Retroperitoneum:   There is moderate calcified plaque throughout the aorta which is normal caliber with no aneurysm or dissection and no retroperitoneal mass or adenopathy Bones/Soft Tissues: There are moderate degenerative changes throughout the spine with mild scoliosis. Small air-fluid levels throughout the bowel which could be due to a developing early ileus or enteritis. Recommend short-term follow-up Mild bibasilar atelectasis or early infiltrates. Recommend follow-up with chest x-rays. 3 cm cyst right kidney and smaller hypodensities scattered in both kidneys which may also represent cysts but are too small to further characterize. Mild cortical scarring along the upper pole right kidney with no hydronephrosis or renal stones. Atrophic uterus with no pelvic mass or active inflammation. Mild chronic liver changes with fatty replacement throughout. XR CHEST PORTABLE    Result Date: 7/14/2021  EXAMINATION: ONE XRAY VIEW OF THE CHEST 7/14/2021 8:40 pm COMPARISON: 10/12/2016 HISTORY: ORDERING SYSTEM PROVIDED HISTORY: near syncope, n/v/d, hx copd TECHNOLOGIST PROVIDED HISTORY: Reason for exam:->near syncope, n/v/d, hx copd Reason for Exam: near syncope, n/v/d, hx copd Acuity: Unknown Type of Exam: Unknown FINDINGS: Cardiac silhouette is normal.  There is thoracic aortic calcification. Hilar contours are normal.  No focal airspace disease. No acute cardiopulmonary disease.        Discharge Exam:  /63   Pulse 80   Temp 96.7 °F (35.9 °C) (Temporal)   Resp 14   Ht 5' 8\" (1.727 m)   Wt 186 lb 14.4 oz (84.8 kg) SpO2 93%   BMI 28.42 kg/m²   General appearance: alert, appears stated age and cooperative  Head: Normocephalic, without obvious abnormality, atraumatic  Lungs: clear to auscultation bilaterally, diminished breath sounds bibasilar and wheezes anterior - bilateral  Heart: regular rate and rhythm, S1, S2 normal, no murmur, click, rub or gallop  Abdomen: soft, non-tender; bowel sounds normal; no masses,  no organomegaly  Extremities: extremities normal, atraumatic, no cyanosis or edema  Pulses: 2+ and symmetric  Skin: Skin color, texture, turgor normal. No rashes or lesions  Neurologic: Grossly normal    Disposition: home    Condition: stable    Discharge Medications:   Ericka Weiss   Home Medication Instructions VQC:[id number]    Printed on:07/16/21 1652   Medication Information                      albuterol (PROVENTIL HFA) 108 (90 BASE) MCG/ACT inhaler  Inhale 2 puffs into the lungs every 6 hours as needed for Wheezing. aspirin 81 MG chewable tablet  Take 81 mg by mouth daily             atenolol (TENORMIN) 100 MG tablet  Take 1 tablet by mouth daily             budesonide-formoterol (SYMBICORT) 160-4.5 MCG/ACT AERO  Inhale 2 puffs into the lungs 2 times daily             pravastatin (PRAVACHOL) 20 MG tablet  Take 1 tablet by mouth daily             sertraline (ZOLOFT) 50 MG tablet  Take 50 mg by mouth daily                 Allergies: Allergies   Allergen Reactions    Pcn [Penicillins] Hives         Patient Instructions: Activity: activity as tolerated  Diet: regular diet  Wound Care: none needed    Follow up Instructions: Follow-up with PCP: Emily Ramires MD in  1 week.     Total time spent on day of discharge including face-to-face visit, examination, documentation, counseling, preparation of discharge plans and followup, and discharge medicine reconciliation and presciptions is 36 minutes    Signed:  SHAHID Garay CNP  7/16/2021  4:52 PM

## 2021-07-16 NOTE — CARE COORDINATION
Richard received a referral to this patient for home 02 @ 2 lpm cont via nc. Pull behind portable concentrator and a portable tank have been delivered to the patients room for discharge. Thank you for the referral.  Electronically signed by Tabby Abad on 7/16/2021 at 4:45 PM  Cell ph# [phone]    NOTE: After 5:00 pm, Weekends, Holidays: Call [facility] at [phone] to coordinate delivery of home medical equipment.

## 2021-07-16 NOTE — PROGRESS NOTES
Home Oxygen Evaluation          O2 sat on room air at rest =94%       O2 sat on room air with exertion = 87%       O2 sat on 1LPM oxygen with exertion = 89%       O2 sat on 2LPM oxygen with exertion = 92%

## 2021-09-10 ENCOUNTER — HOSPITAL ENCOUNTER (OUTPATIENT)
Dept: WOMENS IMAGING | Age: 71
Discharge: HOME | End: 2021-09-10
Payer: COMMERCIAL

## 2021-09-10 VITALS — HEIGHT: 68 IN | BODY MASS INDEX: 25.76 KG/M2 | WEIGHT: 170 LBS

## 2021-09-10 DIAGNOSIS — Z12.31 ENCOUNTER FOR SCREENING MAMMOGRAM FOR MALIGNANT NEOPLASM OF BREAST: ICD-10-CM

## 2021-09-10 PROCEDURE — 77063 BREAST TOMOSYNTHESIS BI: CPT

## 2021-09-13 ENCOUNTER — TELEPHONE (OUTPATIENT)
Dept: WOMENS IMAGING | Age: 71
End: 2021-09-13

## 2021-09-13 NOTE — TELEPHONE ENCOUNTER
IN [facility] regarding screening mammogram results and follow up imaging recommendations.

## 2021-09-30 ENCOUNTER — TELEPHONE (OUTPATIENT)
Dept: WOMENS IMAGING | Age: 71
End: 2021-09-30

## 2021-09-30 ENCOUNTER — HOSPITAL ENCOUNTER (OUTPATIENT)
Dept: WOMENS IMAGING | Age: 71
Discharge: HOME | End: 2021-09-30
Payer: COMMERCIAL

## 2021-09-30 DIAGNOSIS — R92.8 ABNORMAL MAMMOGRAM: ICD-10-CM

## 2021-09-30 PROCEDURE — G0279 TOMOSYNTHESIS, MAMMO: HCPCS

## 2021-09-30 NOTE — TELEPHONE ENCOUNTER
Left message for patient on  requesting return call. Reaching out to discuss patient leaving during her exam this morning, want her to return today if possible to finish.

## 2022-07-19 ENCOUNTER — HOSPITAL ENCOUNTER (OUTPATIENT)
Dept: GENERAL RADIOLOGY | Age: 72
Discharge: HOME | End: 2022-07-19
Payer: COMMERCIAL

## 2022-07-19 DIAGNOSIS — M19.90 OSTEOARTHRITIS, UNSPECIFIED OSTEOARTHRITIS TYPE, UNSPECIFIED SITE: ICD-10-CM

## 2022-07-19 DIAGNOSIS — M81.0 SENILE OSTEOPOROSIS: ICD-10-CM

## 2022-07-19 PROCEDURE — 77080 DXA BONE DENSITY AXIAL: CPT

## 2023-03-17 ENCOUNTER — HOSPITAL ENCOUNTER (OUTPATIENT)
Dept: GENERAL RADIOLOGY | Age: 73
Discharge: HOME | End: 2023-03-17
Payer: COMMERCIAL

## 2023-03-17 ENCOUNTER — HOSPITAL ENCOUNTER (OUTPATIENT)
Age: 73
Discharge: HOME | End: 2023-03-17
Payer: COMMERCIAL

## 2023-03-17 DIAGNOSIS — M25.512 LEFT SHOULDER PAIN, UNSPECIFIED CHRONICITY: ICD-10-CM

## 2023-03-17 PROCEDURE — 73030 X-RAY EXAM OF SHOULDER: CPT

## 2024-02-05 NOTE — CARE COORDINATION
Department of Anesthesiology  Postprocedure Note    Patient: Erin Ramirez  MRN: [id number]  YOB: 1945  Date of evaluation: 2/5/2024    Procedure Summary       Date: 02/05/24 Room / Location: [address]    Anesthesia Start: 0947 Anesthesia Stop: 1016    Procedure: EGD SUBMUCOSAL/BOTOX INJECTION Diagnosis:       Dysphagia, unspecified type      (Dysphagia, unspecified type [R13.10])    Surgeons: Jada Mclain MD Responsible Provider: Josemanuel De Paz MD    Anesthesia Type: MAC ASA Status: 4            Anesthesia Type: MAC    Alhaji Phase I: Alhaji Score: 10    Alhaji Phase II:      Anesthesia Post Evaluation    Patient location during evaluation: PACU  Patient participation: complete - patient participated  Level of consciousness: awake  Pain score: 1  Airway patency: patent  Nausea & Vomiting: no nausea and no vomiting  Cardiovascular status: blood pressure returned to baseline and hemodynamically stable  Respiratory status: acceptable  Hydration status: euvolemic  Pain management: adequate    No notable events documented.   SW was informed by SHERRY Gould that patient will likely discharge home today, and will need home O2. Referred to [facility] for home O2 needs ([phone]).     Lynne Osler MSW, [address] [facility]